# Patient Record
Sex: MALE | Race: WHITE | Employment: PART TIME | URBAN - METROPOLITAN AREA
[De-identification: names, ages, dates, MRNs, and addresses within clinical notes are randomized per-mention and may not be internally consistent; named-entity substitution may affect disease eponyms.]

---

## 2020-09-15 ENCOUNTER — OFFICE VISIT (OUTPATIENT)
Dept: UROLOGY | Facility: CLINIC | Age: 30
End: 2020-09-15
Payer: COMMERCIAL

## 2020-09-15 VITALS
SYSTOLIC BLOOD PRESSURE: 100 MMHG | HEART RATE: 80 BPM | WEIGHT: 165 LBS | HEIGHT: 68 IN | BODY MASS INDEX: 25.01 KG/M2 | DIASTOLIC BLOOD PRESSURE: 68 MMHG

## 2020-09-15 DIAGNOSIS — N50.819 PAIN IN TESTICLE: Primary | ICD-10-CM

## 2020-09-15 LAB
ALBUMIN UR-MCNC: NEGATIVE MG/DL
APPEARANCE UR: CLEAR
BILIRUB UR QL STRIP: NEGATIVE
COLOR UR AUTO: YELLOW
GLUCOSE UR STRIP-MCNC: NEGATIVE MG/DL
HGB UR QL STRIP: ABNORMAL
KETONES UR STRIP-MCNC: NEGATIVE MG/DL
LEUKOCYTE ESTERASE UR QL STRIP: NEGATIVE
NITRATE UR QL: NEGATIVE
PH UR STRIP: 8.5 PH (ref 5–7)
SOURCE: ABNORMAL
SP GR UR STRIP: 1.01 (ref 1–1.03)
UROBILINOGEN UR STRIP-ACNC: 0.2 EU/DL (ref 0.2–1)

## 2020-09-15 PROCEDURE — 81003 URINALYSIS AUTO W/O SCOPE: CPT | Mod: QW | Performed by: UROLOGY

## 2020-09-15 PROCEDURE — 99202 OFFICE O/P NEW SF 15 MIN: CPT | Performed by: UROLOGY

## 2020-09-15 RX ORDER — ALBUTEROL SULFATE 90 UG/1
1-2 AEROSOL, METERED RESPIRATORY (INHALATION) PRN
COMMUNITY

## 2020-09-15 RX ORDER — BUDESONIDE AND FORMOTEROL FUMARATE DIHYDRATE 160; 4.5 UG/1; UG/1
AEROSOL RESPIRATORY (INHALATION) 2 TIMES DAILY
COMMUNITY

## 2020-09-15 RX ORDER — FLUTICASONE PROPIONATE 50 MCG
2 SPRAY, SUSPENSION (ML) NASAL
COMMUNITY

## 2020-09-15 SDOH — HEALTH STABILITY: MENTAL HEALTH: HOW OFTEN DO YOU HAVE A DRINK CONTAINING ALCOHOL?: 2-4 TIMES A MONTH

## 2020-09-15 ASSESSMENT — MIFFLIN-ST. JEOR: SCORE: 1692.94

## 2020-09-15 ASSESSMENT — PAIN SCALES - GENERAL: PAINLEVEL: NO PAIN (0)

## 2020-09-15 NOTE — NURSING NOTE
Having intermittent L testicular discomfort. Had ultrasound in past showed cyst. Has seen Urology before  Dr Rosa.Angie Hunter LPN

## 2020-09-15 NOTE — PROGRESS NOTES
Yusuf is the 30-year-old grandson of Jacobo knox MD.  He has been having left testicular discomfort for a few months.  This is similar to the discomfort he had in 2011 that was evaluated by Sha Rosa MD.  At that time his testicular ultrasound was normal and his office exam was normal.  He is not sexually active, has had no STDs or prostatitis.  He is concerned about an abnormality on the left side inferiorly testicular self-exam.  He denies any dysuria or hematuria and has a normal urinalysis.  He has had no ejaculatory pain.  No urethral discharge  Other past medical history: Asthma, non-smoker, orthopedic surgery                                          Medications: Albuterol, Symbicort inhaler, Flonase spray  Allergies: None  Review of systems: As above  Exam: Alert and oriented, normal appearance, normal vital signs.  Normal respirations, neuro grossly intact.  Normal phallus and urethral meatus.  Normal scrotum.  Testicles descended without masses or tenderness.  Both epididymis is normal.  There is a subtle difference in the lower tail of the epididymis on the left as it forms the vas deferens-possibly due to transition to a convoluted tube.  This area is nontender and there is no mass palpable.  Normal sphincter tone, no rectal mass or impaction, small benign and symmetric prostate.  Normal seminal vesicles that are nontender  Assessment: Normal urinalysis and physical exam.  No evidence for testicular mass or history that suggest torsion, bacterial prostatitis or STD.  Plan: Reassured but would like to update testicular ultrasound.  Recommend monthly testicular self-examinations and notify me of any change.

## 2020-09-15 NOTE — LETTER
9/15/2020       RE: Brock Swartz  [address] [facility] [id number]     Dear Colleague,    Thank you for referring your patient, Brock Swartz, to the [facility] [place] [facility]. Please see a copy of my visit note below.    Yusuf is the 30-year-old grandson of Jacobo knox MD.  He has been having left testicular discomfort for a few months.  This is similar to the discomfort he had in 2011 that was evaluated by Sha Rosa MD.  At that time his testicular ultrasound was normal and his office exam was normal.  He is not sexually active, has had no STDs or prostatitis.  He is concerned about an abnormality on the left side inferiorly testicular self-exam.  He denies any dysuria or hematuria and has a normal urinalysis.  He has had no ejaculatory pain.  No urethral discharge  Other past medical history: Asthma, non-smoker, orthopedic surgery                                          Medications: Albuterol, Symbicort inhaler, Flonase spray  Allergies: None  Review of systems: As above  Exam: Alert and oriented, normal appearance, normal vital signs.  Normal respirations, neuro grossly intact.  Normal phallus and urethral meatus.  Normal scrotum.  Testicles descended without masses or tenderness.  Both epididymis is normal.  There is a subtle difference in the lower tail of the epididymis on the left as it forms the vas deferens-possibly due to transition to a convoluted tube.  This area is nontender and there is no mass palpable.  Normal sphincter tone, no rectal mass or impaction, small benign and symmetric prostate.  Normal seminal vesicles that are nontender  Assessment: Normal urinalysis and physical exam.  No evidence for testicular mass or history that suggest torsion, bacterial prostatitis or STD.  Plan: Reassured but would like to update testicular ultrasound.  Recommend monthly testicular self-examinations  and notify me of any change.      Sincerely,    Sha Batres MD

## 2020-09-29 ENCOUNTER — HOSPITAL ENCOUNTER (OUTPATIENT)
Dept: ULTRASOUND IMAGING | Facility: CLINIC | Age: 30
Discharge: HOME | End: 2020-09-29
Attending: UROLOGY | Admitting: UROLOGY
Payer: COMMERCIAL

## 2020-09-29 DIAGNOSIS — N50.819 PAIN IN TESTICLE: ICD-10-CM

## 2020-09-29 PROCEDURE — 76870 US EXAM SCROTUM: CPT
